# Patient Record
Sex: FEMALE | HISPANIC OR LATINO
[De-identification: names, ages, dates, MRNs, and addresses within clinical notes are randomized per-mention and may not be internally consistent; named-entity substitution may affect disease eponyms.]

---

## 2017-09-21 ENCOUNTER — FORM ENCOUNTER (OUTPATIENT)
Age: 52
End: 2017-09-21

## 2018-11-28 ENCOUNTER — FORM ENCOUNTER (OUTPATIENT)
Age: 53
End: 2018-11-28

## 2019-01-10 ENCOUNTER — FORM ENCOUNTER (OUTPATIENT)
Age: 54
End: 2019-01-10

## 2019-04-14 ENCOUNTER — FORM ENCOUNTER (OUTPATIENT)
Age: 54
End: 2019-04-14

## 2019-04-15 ENCOUNTER — FORM ENCOUNTER (OUTPATIENT)
Age: 54
End: 2019-04-15

## 2019-04-21 ENCOUNTER — FORM ENCOUNTER (OUTPATIENT)
Age: 54
End: 2019-04-21

## 2019-05-12 ENCOUNTER — FORM ENCOUNTER (OUTPATIENT)
Age: 54
End: 2019-05-12

## 2019-05-15 ENCOUNTER — FORM ENCOUNTER (OUTPATIENT)
Age: 54
End: 2019-05-15

## 2019-05-27 ENCOUNTER — FORM ENCOUNTER (OUTPATIENT)
Age: 54
End: 2019-05-27

## 2019-06-03 ENCOUNTER — FORM ENCOUNTER (OUTPATIENT)
Age: 54
End: 2019-06-03

## 2019-06-19 ENCOUNTER — FORM ENCOUNTER (OUTPATIENT)
Age: 54
End: 2019-06-19

## 2019-08-15 ENCOUNTER — FORM ENCOUNTER (OUTPATIENT)
Age: 54
End: 2019-08-15

## 2019-11-03 ENCOUNTER — FORM ENCOUNTER (OUTPATIENT)
Age: 54
End: 2019-11-03

## 2021-02-24 PROBLEM — Z00.00 ENCOUNTER FOR PREVENTIVE HEALTH EXAMINATION: Status: ACTIVE | Noted: 2021-02-24

## 2021-03-08 ENCOUNTER — NON-APPOINTMENT (OUTPATIENT)
Age: 56
End: 2021-03-08

## 2021-03-10 ENCOUNTER — APPOINTMENT (OUTPATIENT)
Dept: BREAST CENTER | Facility: CLINIC | Age: 56
End: 2021-03-10
Payer: MEDICAID

## 2021-03-10 VITALS
HEART RATE: 70 BPM | BODY MASS INDEX: 28.17 KG/M2 | HEIGHT: 64 IN | DIASTOLIC BLOOD PRESSURE: 85 MMHG | WEIGHT: 165 LBS | SYSTOLIC BLOOD PRESSURE: 144 MMHG

## 2021-03-10 DIAGNOSIS — Z78.9 OTHER SPECIFIED HEALTH STATUS: ICD-10-CM

## 2021-03-10 DIAGNOSIS — Z85.3 PERSONAL HISTORY OF MALIGNANT NEOPLASM OF BREAST: ICD-10-CM

## 2021-03-10 PROCEDURE — 99072 ADDL SUPL MATRL&STAF TM PHE: CPT

## 2021-03-10 PROCEDURE — 99213 OFFICE O/P EST LOW 20 MIN: CPT

## 2021-03-10 RX ORDER — IBUPROFEN 800 MG/1
800 TABLET, FILM COATED ORAL
Refills: 0 | Status: ACTIVE | COMMUNITY

## 2021-03-10 RX ORDER — TAMOXIFEN CITRATE 20 MG/1
20 TABLET, FILM COATED ORAL
Refills: 0 | Status: ACTIVE | COMMUNITY

## 2021-03-10 NOTE — DATA REVIEWED
[FreeTextEntry1] : 11/29/18) B/L MG: Extremely dense, L: no masses R: Questionable area of distortion central retroareolar, Rec R spot compression views and b/l US BI-RADS 0\par \par (1/11/19) R MG: Area of distortion persists on spot compression, Rec R Stereo bx\par B/L US: b/l cystic foci, L: 0.5cm new suspicious hypoechic nodule 5:00 6cmFN Rec L US core bx\par BI-RADS 4\par \par (4/15/19) R Stereo bx: Randy/cork clip placed, in appropriate position. PATH: Well differentiated IDC associated with calcifications, with DCIS associated with calcifications. Flat epithelial atypia, ADH and fibrocystic changes all associated with calcifications. ER+(70%) ME(-) HER2(-) Due to density on mammogram and subtle findings on mammogram, can consider breast MRI for staging.\par \par (4/16/19) L US core bx: Venus shaped clip placed, in appropriate position, PATH: Fibroadenoma with a microscopic focus of ADH, images concordant\par \par (5/13/19) Breast MR: 1. Focal area of enhancement in the superior right breast at the site of architectural distortion and a signal void from a tissue marker in the retroareolar right breast at the site of a tissue marker\par which corresponds to a known malignancy. NLOC to include the tissue marker and architectural distortion is suggested. 2. Signal void from a tissue marker in the lower outer quadrant of the left breast at the site of known atypia. NLOC of the tissue marker is suggested. 3. Enhancing mass in the upper inner quadrant of the left breast. Since the patient has a known contralateral breast cancer and ipsilateral atypical finding, targeted ultrasound for possible biopsy is suggested. In the absence of a US correlate. Biopsy under MR guidance is suggested.\par \par (5/13/19) L US core bx: (US performed prior to bx, noted 0.7cm hypoechoic nodule 10:00 4cmFN) S shaped clip placed, in appropriate position, PATH: Multilocular cystic and papillary apocrine metaplasia. Focal adenosis with associated calcifications in the background of dense stromal fibrosis, concordant, Rec 6 month follow up MRI and US\par \par SURGERY (5/16/19) B/L NLOC Lump w/ R SNBx: RIGHT: Well differentiated IDC, measuring 0.7cm, superior marign: 5mm, anterior margin: 7mm, remaining margins: >10mm; DCIS present in 4/9 slides posterior margin:\par 5mm, anterior and superior margin: 6mm, remaining margins: >10mm, 0/3LN ER+(98%) ME+(4%) HER2 Equivocal FISH NON-AMPLIFIED LEFT: Sclerosed IDP, no atypia or carcinoma present, fibroadenoma, clip present.\par \par [facility] (11/4/2019): Dx B/L MG & US - Dense. B/l postsurgical changes. LUOQ 1.1cm benign appearing oval, circumscribed, round mass, stable. US- R 1:00, 5FN scarring. Multiple L cysts, largest at L 1.1cm, 2:00, 4FN. BIRADS 2. \par

## 2021-03-10 NOTE — HISTORY OF PRESENT ILLNESS
[FreeTextEntry1] : 55 year postmenopausal female previously followed by Dr. 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 Jain and Tamoxifen x2 years with Dr. Momin. Patient has no breast complaints today. Denies nipple discharge, nipple/breast skin changes or dimpling. Denies fever and chills. \par \par Patient is overdue for imaging. Will obtain all imaging.\par \par Patient has not seen medical oncology since her surgery. Moved her appt up to March 29th with Dr. Momin. \par \par Discussed importance and implication of genetic testing in regards to her family history of male breast cancer, her personal hx of breast cancer and offspring. Patient would like to go forward with genetic testing.\par

## 2021-03-10 NOTE — PHYSICAL EXAM
[Normocephalic] : normocephalic [Atraumatic] : atraumatic [Supple] : supple [No Supraclavicular Adenopathy] : no supraclavicular adenopathy [Examined in the supine and seated position] : examined in the supine and seated position [No dominant masses] : no dominant masses in right breast  [No Nipple Retraction] : no left nipple retraction [No Nipple Discharge] : no left nipple discharge [No Axillary Lymphadenopathy] : no left axillary lymphadenopathy [No Edema] : no edema [No Rashes] : no rashes [No Ulceration] : no ulceration

## 2021-03-10 NOTE — PAST MEDICAL HISTORY
[Menarche Age ____] : age at menarche was [unfilled] [Menopause Age____] : age at menopause was [unfilled] [Total Preg ___] : G[unfilled] [Live Births ___] : P[unfilled]  [Age At Live Birth ___] : Age at live birth: [unfilled] [History of Hormone Replacement Treatment] : has no history of hormone replacement treatment [FreeTextEntry6] : no [FreeTextEntry7] : yes [FreeTextEntry8] : yes

## 2021-03-24 ENCOUNTER — NON-APPOINTMENT (OUTPATIENT)
Age: 56
End: 2021-03-24

## 2021-03-29 ENCOUNTER — NON-APPOINTMENT (OUTPATIENT)
Age: 56
End: 2021-03-29

## 2021-09-13 ENCOUNTER — NON-APPOINTMENT (OUTPATIENT)
Age: 56
End: 2021-09-13

## 2021-09-22 ENCOUNTER — TRANSCRIPTION ENCOUNTER (OUTPATIENT)
Age: 56
End: 2021-09-22

## 2021-09-22 ENCOUNTER — APPOINTMENT (OUTPATIENT)
Dept: BREAST CENTER | Facility: CLINIC | Age: 56
End: 2021-09-22
Payer: MEDICAID

## 2021-09-22 VITALS
WEIGHT: 170 LBS | OXYGEN SATURATION: 98 % | HEART RATE: 56 BPM | BODY MASS INDEX: 29.02 KG/M2 | SYSTOLIC BLOOD PRESSURE: 125 MMHG | DIASTOLIC BLOOD PRESSURE: 87 MMHG | HEIGHT: 64 IN

## 2021-09-22 DIAGNOSIS — R92.8 OTHER ABNORMAL AND INCONCLUSIVE FINDINGS ON DIAGNOSTIC IMAGING OF BREAST: ICD-10-CM

## 2021-09-22 PROCEDURE — 99213 OFFICE O/P EST LOW 20 MIN: CPT

## 2021-09-22 NOTE — DATA REVIEWED
[FreeTextEntry1] : 11/29/18) B/L MG: Extremely dense, L: no masses R: Questionable area of distortion central retroareolar, Rec R spot compression views and b/l US BI-RADS 0\par \par (1/11/19) R MG: Area of distortion persists on spot compression, Rec R Stereo bx\par B/L US: b/l cystic foci, L: 0.5cm new suspicious hypoechic nodule 5:00 6cmFN Rec L US core bx\par BI-RADS 4\par \par (4/15/19) R Stereo bx: Randy/cork clip placed, in appropriate position. PATH: Well differentiated IDC associated with calcifications, with DCIS associated with calcifications. Flat epithelial atypia, ADH and fibrocystic changes all associated with calcifications. ER+(70%) MI(-) HER2(-) Due to density on mammogram and subtle findings on mammogram, can consider breast MRI for staging.\par \par (4/16/19) L US core bx: Venus shaped clip placed, in appropriate position, PATH: Fibroadenoma with a microscopic focus of ADH, images concordant\par \par (5/13/19) Breast MR: 1. Focal area of enhancement in the superior right breast at the site of architectural distortion and a signal void from a tissue marker in the retroareolar right breast at the site of a tissue marker\par which corresponds to a known malignancy. NLOC to include the tissue marker and architectural distortion is suggested. 2. Signal void from a tissue marker in the lower outer quadrant of the left breast at the site of known atypia. NLOC of the tissue marker is suggested. 3. Enhancing mass in the upper inner quadrant of the left breast. Since the patient has a known contralateral breast cancer and ipsilateral atypical finding, targeted ultrasound for possible biopsy is suggested. In the absence of a US correlate. Biopsy under MR guidance is suggested.\par \par (5/13/19) L US core bx: (US performed prior to bx, noted 0.7cm hypoechoic nodule 10:00 4cmFN) S shaped clip placed, in appropriate position, PATH: Multilocular cystic and papillary apocrine metaplasia. Focal adenosis with associated calcifications in the background of dense stromal fibrosis, concordant, Rec 6 month follow up MRI and US\par \par SURGERY (5/16/19) B/L NLOC Lump w/ R SNBx: RIGHT: Well differentiated IDC, measuring 0.7cm, superior marign: 5mm, anterior margin: 7mm, remaining margins: >10mm; DCIS present in 4/9 slides posterior margin:\par 5mm, anterior and superior margin: 6mm, remaining margins: >10mm, 0/3LN ER+(98%) MI+(4%) HER2 Equivocal FISH NON-AMPLIFIED LEFT: Sclerosed IDP, no atypia or carcinoma present, fibroadenoma, clip present.\par \par [facility] (11/4/2019): Dx B/L MG & US - Dense. B/l postsurgical changes. LUOQ 1.1cm benign appearing oval, circumscribed, round mass, stable. US- R 1:00, 5FN scarring. Multiple L cysts, largest at L 1.1cm, 2:00, 4FN. BIRADS 2. \par  \par 03/23/2021 (R) b/l dxMMG/US: The breasts are heterogeneously dense, which may obscure small masses. No mammographic findings suspicious for malignancy. Postoperative architectural distortion bilaterally. The complicated cysts in the left breast at 2:00 and 4:00 should be followed with interval sonography. BI-RADS Category 3: Probably benign. \par \par 03/23/2021 (R) b/l MRI: There is heterogeneously dense fibroglandular tissue with mild background parenchymal enhancement. No findings suspicious for malignancy. BI-RADS Category 2: Benign.

## 2021-09-22 NOTE — HISTORY OF PRESENT ILLNESS
[FreeTextEntry1] : 56 year postmenopausal female  presents for follow up of hx of right 0.7cm Invasive ductal carcinoma and left fibroadenoma w/ microscopic focus of ADH in May 2019 s/p bilateral needle localization lumpectomy and right SNB, (0/3 LN), ER+(98%) [place]+(4%) HER2 Equivocal FISH NON-AMPLIFIED, followed by RT with Dr. Jain and Tamoxifen x2 years with Dr. Momin. Patient has no breast complaints today. Denies nipple discharge, nipple/breast skin changes or dimpling. Denies fever and chills. \par \par Patient has not seen medical oncology since her surgery. Moved her appt up to March 29th with Dr. Momin. ??\par \par Discussed importance and implication of genetic testing in regards to her family history of male breast cancer, her personal hx of breast cancer and offspring. Patient would like to go forward with genetic testing.\par

## 2021-09-30 ENCOUNTER — NON-APPOINTMENT (OUTPATIENT)
Age: 56
End: 2021-09-30

## 2021-10-19 ENCOUNTER — NON-APPOINTMENT (OUTPATIENT)
Age: 56
End: 2021-10-19

## 2022-03-22 ENCOUNTER — NON-APPOINTMENT (OUTPATIENT)
Age: 57
End: 2022-03-22

## 2022-03-29 ENCOUNTER — APPOINTMENT (OUTPATIENT)
Dept: BREAST CENTER | Facility: CLINIC | Age: 57
End: 2022-03-29
Payer: MEDICAID

## 2022-03-29 VITALS
HEIGHT: 63 IN | SYSTOLIC BLOOD PRESSURE: 130 MMHG | HEART RATE: 66 BPM | BODY MASS INDEX: 29.59 KG/M2 | WEIGHT: 167 LBS | DIASTOLIC BLOOD PRESSURE: 84 MMHG

## 2022-03-29 DIAGNOSIS — Z80.3 FAMILY HISTORY OF MALIGNANT NEOPLASM OF BREAST: ICD-10-CM

## 2022-03-29 PROCEDURE — 99213 OFFICE O/P EST LOW 20 MIN: CPT

## 2022-03-30 NOTE — HISTORY OF PRESENT ILLNESS
[FreeTextEntry1] : 56 year postmenopausal female presents for follow up of hx of right 0.7cm Invasive ductal carcinoma and left fibroadenoma w/ microscopic focus of ADH in May 2019 s/p bilateral needle localization lumpectomy and right SNB, (0/3 LN), ER+(98%) MO+(4%) HER2 Equivocal FISH NON-AMPLIFIED, followed by RT with Dr. Jain and Tamoxifen with Dr. Mmoin, patient states she is still on Tamoxifen with hot flashes. Patient has no breast complaints today. Denies nipple discharge, nipple/breast skin changes or dimpling. Denies fever and chills. \par \par Patient is on tamoxifen, managed by Dr. Momin.\par \par She underwent genetic testing in 10/2021 showing VUS in MSH6 and MUTYH \par

## 2022-03-30 NOTE — DATA REVIEWED
[FreeTextEntry1] : 11/29/18) B/L MG: Extremely dense, L: no masses R: Questionable area of distortion central retroareolar, Rec R spot compression views and b/l US BI-RADS 0\par \par (1/11/19) R MG: Area of distortion persists on spot compression, Rec R Stereo bx\par B/L US: b/l cystic foci, L: 0.5cm new suspicious hypoechic nodule 5:00 6cmFN Rec L US core bx\par BI-RADS 4\par \par (4/15/19) R Stereo bx: Randy/cork clip placed, in appropriate position. PATH: Well differentiated IDC associated with calcifications, with DCIS associated with calcifications. Flat epithelial atypia, ADH and fibrocystic changes all associated with calcifications. ER+(70%) RI(-) HER2(-) Due to density on mammogram and subtle findings on mammogram, can consider breast MRI for staging.\par \par (4/16/19) L US core bx: Venus shaped clip placed, in appropriate position, PATH: Fibroadenoma with a microscopic focus of ADH, images concordant\par \par (5/13/19) Breast MR: 1. Focal area of enhancement in the superior right breast at the site of architectural distortion and a signal void from a tissue marker in the retroareolar right breast at the site of a tissue marker\par which corresponds to a known malignancy. NLOC to include the tissue marker and architectural distortion is suggested. 2. Signal void from a tissue marker in the lower outer quadrant of the left breast at the site of known atypia. NLOC of the tissue marker is suggested. 3. Enhancing mass in the upper inner quadrant of the left breast. Since the patient has a known contralateral breast cancer and ipsilateral atypical finding, targeted ultrasound for possible biopsy is suggested. In the absence of a US correlate. Biopsy under MR guidance is suggested.\par \par (5/13/19) L US core bx: (US performed prior to bx, noted 0.7cm hypoechoic nodule 10:00 4cmFN) S shaped clip placed, in appropriate position, PATH: Multilocular cystic and papillary apocrine metaplasia. Focal adenosis with associated calcifications in the background of dense stromal fibrosis, concordant, Rec 6 month follow up MRI and US\par \par SURGERY (5/16/19) B/L NLOC Lump w/ R SNBx: RIGHT: Well differentiated IDC, measuring 0.7cm, superior marign: 5mm, anterior margin: 7mm, remaining margins: >10mm; DCIS present in 4/9 slides posterior margin:\par 5mm, anterior and superior margin: 6mm, remaining margins: >10mm, 0/3LN ER+(98%) RI+(4%) HER2 Equivocal FISH NON-AMPLIFIED LEFT: Sclerosed IDP, no atypia or carcinoma present, fibroadenoma, clip present.\par \par [facility] (11/4/2019): Dx B/L MG & US - Dense. B/l postsurgical changes. LUOQ 1.1cm benign appearing oval, circumscribed, round mass, stable. US- R 1:00, 5FN scarring. Multiple L cysts, largest at L 1.1cm, 2:00, 4FN. BIRADS 2. \par  \par 03/23/2021 ([facility]) b/l dxMMG/US: The breasts are heterogeneously dense, which may obscure small masses. No mammographic findings suspicious for malignancy. Postoperative architectural distortion bilaterally. The complicated cysts in the left breast at 2:00 and 4:00 should be followed with interval sonography. BI-RADS Category 3: Probably benign. \par \par 03/23/2021 (R) b/l MRI: There is heterogeneously dense fibroglandular tissue with mild background parenchymal enhancement. No findings suspicious for malignancy. BI-RADS Category 2: Benign. \par \par 9/29/2021 (R) left breast targeted US showing Probably benign complicated cyst in the left breast at the 4:00 axis. Recommend 6 month follow-up targeted left breast ultrasound along with the patient's screening studies for which she is due in March 2022. FOLLOW-UP: Follow-up imaging in 6 months. ASSESSMENT: BI-RADS Category 3: Probably benign\par \par 10/2/2021 ([facility]) 47 HBOC Genetic Testing Panel: VUS in MSH6 and MUTYH \par \par 3/29/2022 (R) bilateral dx mammogram/US showing breasts are heterogeneously dense, Probably benign complicated cyst in the right retroareolar region. Recommend 6 month follow-up targeted right breast ultrasound.  FOLLOW-UP: Follow-up imaging in 6 months.  ASSESSMENT: BI-RADS Category 3:  Probably benign\par

## 2022-07-27 ENCOUNTER — NON-APPOINTMENT (OUTPATIENT)
Age: 57
End: 2022-07-27

## 2022-09-06 ENCOUNTER — NON-APPOINTMENT (OUTPATIENT)
Age: 57
End: 2022-09-06

## 2022-09-09 ENCOUNTER — NON-APPOINTMENT (OUTPATIENT)
Age: 57
End: 2022-09-09

## 2022-09-14 ENCOUNTER — APPOINTMENT (OUTPATIENT)
Dept: BREAST CENTER | Facility: CLINIC | Age: 57
End: 2022-09-14

## 2022-09-14 VITALS
HEIGHT: 63 IN | DIASTOLIC BLOOD PRESSURE: 86 MMHG | SYSTOLIC BLOOD PRESSURE: 122 MMHG | WEIGHT: 167 LBS | TEMPERATURE: 98.4 F | RESPIRATION RATE: 16 BRPM | OXYGEN SATURATION: 100 % | HEART RATE: 59 BPM | BODY MASS INDEX: 29.59 KG/M2

## 2022-09-14 DIAGNOSIS — Z85.3 PERSONAL HISTORY OF MALIGNANT NEOPLASM OF BREAST: ICD-10-CM

## 2022-09-14 DIAGNOSIS — N63.10 UNSPECIFIED LUMP IN THE RIGHT BREAST, UNSPECIFIED QUADRANT: ICD-10-CM

## 2022-09-14 DIAGNOSIS — R92.2 INCONCLUSIVE MAMMOGRAM: ICD-10-CM

## 2022-09-14 PROCEDURE — 99213 OFFICE O/P EST LOW 20 MIN: CPT

## 2022-09-14 NOTE — DATA REVIEWED
[FreeTextEntry1] : 11/29/18) B/L MG: Extremely dense, L: no masses R: Questionable area of distortion central retroareolar, Rec R spot compression views and b/l US BI-RADS 0\par \par (1/11/19) R MG: Area of distortion persists on spot compression, Rec R Stereo bx\par B/L US: b/l cystic foci, L: 0.5cm new suspicious hypoechic nodule 5:00 6cmFN Rec L US core bx\par BI-RADS 4\par \par (4/15/19) R Stereo bx: Randy/cork clip placed, in appropriate position. PATH: Well differentiated IDC associated with calcifications, with DCIS associated with calcifications. Flat epithelial atypia, ADH and fibrocystic changes all associated with calcifications. ER+(70%) IA(-) HER2(-) Due to density on mammogram and subtle findings on mammogram, can consider breast MRI for staging.\par \par (4/16/19) L US core bx: Venus shaped clip placed, in appropriate position, PATH: Fibroadenoma with a microscopic focus of ADH, images concordant\par \par (5/13/19) Breast MR: 1. Focal area of enhancement in the superior right breast at the site of architectural distortion and a signal void from a tissue marker in the retroareolar right breast at the site of a tissue marker\par which corresponds to a known malignancy. NLOC to include the tissue marker and architectural distortion is suggested. 2. Signal void from a tissue marker in the lower outer quadrant of the left breast at the site of known atypia. NLOC of the tissue marker is suggested. 3. Enhancing mass in the upper inner quadrant of the left breast. Since the patient has a known contralateral breast cancer and ipsilateral atypical finding, targeted ultrasound for possible biopsy is suggested. In the absence of a US correlate. Biopsy under MR guidance is suggested.\par \par (5/13/19) L US core bx: (US performed prior to bx, noted 0.7cm hypoechoic nodule 10:00 4cmFN) S shaped clip placed, in appropriate position, PATH: Multilocular cystic and papillary apocrine metaplasia. Focal adenosis with associated calcifications in the background of dense stromal fibrosis, concordant, Rec 6 month follow up MRI and US\par \par SURGERY (5/16/19) B/L NLOC Lump w/ R SNBx: RIGHT: Well differentiated IDC, measuring 0.7cm, superior marign: 5mm, anterior margin: 7mm, remaining margins: >10mm; DCIS present in 4/9 slides posterior margin:\par 5mm, anterior and superior margin: 6mm, remaining margins: >10mm, 0/3LN ER+(98%) IA+(4%) HER2 Equivocal FISH NON-AMPLIFIED LEFT: Sclerosed IDP, no atypia or carcinoma present, fibroadenoma, clip present.\par \par [facility] (11/4/2019): Dx B/L MG & US - Dense. B/l postsurgical changes. LUOQ 1.1cm benign appearing oval, circumscribed, round mass, stable. US- R 1:00, 5FN scarring. Multiple L cysts, largest at L 1.1cm, 2:00, 4FN. BIRADS 2. \par  \par 03/23/2021 ([facility]) b/l dxMMG/US: The breasts are heterogeneously dense, which may obscure small masses. No mammographic findings suspicious for malignancy. Postoperative architectural distortion bilaterally. The complicated cysts in the left breast at 2:00 and 4:00 should be followed with interval sonography. BI-RADS Category 3: Probably benign. \par \par 03/23/2021 (R) b/l MRI: There is heterogeneously dense fibroglandular tissue with mild background parenchymal enhancement. No findings suspicious for malignancy. BI-RADS Category 2: Benign. \par \par 9/29/2021 (R) left breast targeted US showing Probably benign complicated cyst in the left breast at the 4:00 axis. Recommend 6 month follow-up targeted left breast ultrasound along with the patient's screening studies for which she is due in March 2022. FOLLOW-UP: Follow-up imaging in 6 months. ASSESSMENT: BI-RADS Category 3: Probably benign\par \par 10/2/2021 ([facility]) 47 HBOC Genetic Testing Panel: VUS in MSH6 and MUTYH \par \par 3/29/2022 (R) bilateral dx mammogram/US showing breasts are heterogeneously dense, Probably benign complicated cyst in the right retroareolar region. Recommend 6 month follow-up targeted right breast ultrasound.  FOLLOW-UP: Follow-up imaging in 6 months.  ASSESSMENT: BI-RADS Category 3:  Probably benign\par \par 9/14/2022 ([facility]) right breast targeted US: Right breast 11:00 axis retroareolar location an oval mass versus complicated cyst measuring 0.5 x 0.3 x 0.5 cm remains unchanged. Probably benign. IMPRESSION: Probably benign mass right breast 11:00 axis. Recommend additional short interval follow-up in 6 months. FOLLOW-UP: Follow-up imaging in 6 months. ASSESSMENT: BI-RADS Category 3:  Probably benign\par \par 9/14/2022 ([facility]) bilateral breast MRI showing No MR evidence of malignancy. No interval change. Annual mammography is recommended, due in the end of March 2023. Annual high risk screening MRI scan due in September 2023. FOLLOW-UP:  Follow-up imaging in 6 months. 1. Annual bilateral mammogram and supplemental screening breast ultrasound due in March 2023. 2. Annual high risk screening MRI in one year. ASSESSMENT:  BI-RADS Category 1:  Negative.

## 2022-09-14 NOTE — HISTORY OF PRESENT ILLNESS
[FreeTextEntry1] : 57 year postmenopausal female presents for follow up with a history of right 0.7cm Invasive ductal carcinoma, ER+(98%) MO+(4%) HER2 Equivocal FISH negative and left fibroadenoma w/ microscopic focus of ADH in May 2019 s/p bilateral needle localization lumpectomy and right SLNB, (0/3 LN), followed by RT with Dr. Jain and Tamoxifen with Dr. Momin, patient states she is still on Tamoxifen with hot flashes. Patient has no breast complaints today. Denies nipple discharge, nipple/breast skin changes or dimpling. Denies fever and chills. \par \par Patient has a right breast nodule undergoing short interval follow up ultrasound. \par \par Patient is on tamoxifen, managed by Dr. Momin.\par \par She underwent genetic testing in 10/2021 showing VUS in MSH6 and MUTYH \par

## 2023-04-24 ENCOUNTER — NON-APPOINTMENT (OUTPATIENT)
Age: 58
End: 2023-04-24